# Patient Record
Sex: MALE | Race: WHITE | URBAN - METROPOLITAN AREA
[De-identification: names, ages, dates, MRNs, and addresses within clinical notes are randomized per-mention and may not be internally consistent; named-entity substitution may affect disease eponyms.]

---

## 2018-06-18 ENCOUNTER — TELEPHONE (OUTPATIENT)
Dept: CARDIOLOGY CLINIC | Age: 73
End: 2018-06-18

## 2018-06-19 ENCOUNTER — OFFICE VISIT (OUTPATIENT)
Dept: CARDIOLOGY CLINIC | Age: 73
End: 2018-06-19

## 2018-06-19 VITALS
BODY MASS INDEX: 32.45 KG/M2 | SYSTOLIC BLOOD PRESSURE: 120 MMHG | HEART RATE: 68 BPM | OXYGEN SATURATION: 95 % | DIASTOLIC BLOOD PRESSURE: 70 MMHG | WEIGHT: 261 LBS | HEIGHT: 75 IN

## 2018-06-19 DIAGNOSIS — E78.2 MIXED HYPERLIPIDEMIA: ICD-10-CM

## 2018-06-19 DIAGNOSIS — R07.9 CHEST PAIN, UNSPECIFIED TYPE: Primary | ICD-10-CM

## 2018-06-19 DIAGNOSIS — I25.10 CORONARY ARTERY DISEASE INVOLVING NATIVE HEART WITHOUT ANGINA PECTORIS, UNSPECIFIED VESSEL OR LESION TYPE: ICD-10-CM

## 2018-06-19 PROCEDURE — G8598 ASA/ANTIPLAT THER USED: HCPCS | Performed by: NURSE PRACTITIONER

## 2018-06-19 PROCEDURE — 93000 ELECTROCARDIOGRAM COMPLETE: CPT | Performed by: NURSE PRACTITIONER

## 2018-06-19 PROCEDURE — G8417 CALC BMI ABV UP PARAM F/U: HCPCS | Performed by: NURSE PRACTITIONER

## 2018-06-19 PROCEDURE — G8427 DOCREV CUR MEDS BY ELIG CLIN: HCPCS | Performed by: NURSE PRACTITIONER

## 2018-06-19 PROCEDURE — 1036F TOBACCO NON-USER: CPT | Performed by: NURSE PRACTITIONER

## 2018-06-19 PROCEDURE — 1123F ACP DISCUSS/DSCN MKR DOCD: CPT | Performed by: NURSE PRACTITIONER

## 2018-06-19 PROCEDURE — 4040F PNEUMOC VAC/ADMIN/RCVD: CPT | Performed by: NURSE PRACTITIONER

## 2018-06-19 PROCEDURE — 99214 OFFICE O/P EST MOD 30 MIN: CPT | Performed by: NURSE PRACTITIONER

## 2018-06-19 PROCEDURE — 3017F COLORECTAL CA SCREEN DOC REV: CPT | Performed by: NURSE PRACTITIONER

## 2018-06-26 ENCOUNTER — TELEPHONE (OUTPATIENT)
Dept: CARDIOLOGY CLINIC | Age: 73
End: 2018-06-26

## 2018-06-29 ENCOUNTER — PROCEDURE VISIT (OUTPATIENT)
Dept: CARDIOLOGY CLINIC | Age: 73
End: 2018-06-29

## 2018-06-29 DIAGNOSIS — I25.10 CORONARY ARTERY DISEASE INVOLVING NATIVE HEART WITHOUT ANGINA PECTORIS, UNSPECIFIED VESSEL OR LESION TYPE: Primary | ICD-10-CM

## 2018-06-29 DIAGNOSIS — R01.1 MURMUR: ICD-10-CM

## 2018-06-29 LAB
LV EF: 45 %
LVEF MODALITY: NORMAL

## 2018-06-29 PROCEDURE — 93306 TTE W/DOPPLER COMPLETE: CPT | Performed by: INTERNAL MEDICINE

## 2018-07-03 ENCOUNTER — OFFICE VISIT (OUTPATIENT)
Dept: CARDIOLOGY CLINIC | Age: 73
End: 2018-07-03

## 2018-07-03 VITALS
SYSTOLIC BLOOD PRESSURE: 102 MMHG | HEIGHT: 75 IN | HEART RATE: 56 BPM | BODY MASS INDEX: 32.02 KG/M2 | WEIGHT: 257.5 LBS | DIASTOLIC BLOOD PRESSURE: 62 MMHG

## 2018-07-03 DIAGNOSIS — E78.2 MIXED HYPERLIPIDEMIA: Primary | ICD-10-CM

## 2018-07-03 DIAGNOSIS — I25.10 CORONARY ARTERY DISEASE INVOLVING NATIVE HEART WITHOUT ANGINA PECTORIS, UNSPECIFIED VESSEL OR LESION TYPE: ICD-10-CM

## 2018-07-03 PROCEDURE — G8417 CALC BMI ABV UP PARAM F/U: HCPCS | Performed by: NURSE PRACTITIONER

## 2018-07-03 PROCEDURE — 3017F COLORECTAL CA SCREEN DOC REV: CPT | Performed by: NURSE PRACTITIONER

## 2018-07-03 PROCEDURE — 99214 OFFICE O/P EST MOD 30 MIN: CPT | Performed by: NURSE PRACTITIONER

## 2018-07-03 PROCEDURE — 1036F TOBACCO NON-USER: CPT | Performed by: NURSE PRACTITIONER

## 2018-07-03 PROCEDURE — G8427 DOCREV CUR MEDS BY ELIG CLIN: HCPCS | Performed by: NURSE PRACTITIONER

## 2018-07-03 PROCEDURE — 1123F ACP DISCUSS/DSCN MKR DOCD: CPT | Performed by: NURSE PRACTITIONER

## 2018-07-03 PROCEDURE — G8598 ASA/ANTIPLAT THER USED: HCPCS | Performed by: NURSE PRACTITIONER

## 2018-07-03 PROCEDURE — 4040F PNEUMOC VAC/ADMIN/RCVD: CPT | Performed by: NURSE PRACTITIONER

## 2018-07-03 RX ORDER — ATORVASTATIN CALCIUM 40 MG/1
40 TABLET, FILM COATED ORAL DAILY
Qty: 30 TABLET | Refills: 5 | Status: SHIPPED | OUTPATIENT
Start: 2018-07-03 | End: 2020-07-07

## 2018-07-03 RX ORDER — NITROGLYCERIN 0.4 MG/1
0.4 TABLET SUBLINGUAL EVERY 5 MIN PRN
Qty: 25 TABLET | Refills: 3 | Status: SHIPPED | OUTPATIENT
Start: 2018-07-03

## 2018-07-03 NOTE — LETTER
 aspirin 81 MG chewable tablet Take 81 mg by mouth daily.  ALPRAZOLAM PO Take 1 mg by mouth Xanax 3 mg tid      ranolazine (RANEXA) 500 MG SR tablet Take 1 tablet by mouth 2 times daily for 30 days. 60 tablet 3    ranolazine (RANEXA) 500 MG SR tablet Take 1 tablet by mouth 2 times daily for 30 days. 180 tablet 3     No current facility-administered medications on file prior to visit. REVIEW OF SYSTEMS:   CONSTITUTIONAL: No major weight gain or loss or fever. There's been a change in energy level, sleep pattern, or activity level. EYES: No new vision difficulties. ENT:  No HA, hearing loss or ringing in the ears. RESPIRATORY: No new SOB, PND, orthopnea or cough. CARDIOVASCULAR: See HPI  GI: No nausea, vomiting, diarrhea, constipation, abdominal pain, blood in stool. : No urinary frequency, urgency, hematuria or dysuria. SKIN: No rashes or skin lesions. MUSCULOSKELETAL: No new muscle or joint pain, no gait disturbances. NEUROLOGICAL: No syncope or TIA-like symptoms. PSYCHIATRIC: No anxiety, insomnia or depression  ENDO:  No fatigue or temperature intolerance. HEMA:  No bleeding or blood clots. Objective:   PHYSICAL EXAM:        VITALS:    /62 (Site: Right Arm, Position: Sitting, Cuff Size: Large Adult)   Pulse 56   Ht 6' 3\" (1.905 m)   Wt 257 lb 8 oz (116.8 kg)   BMI 32.19 kg/m²      CONSTITUTIONAL: Cooperative, no apparent distress  NEUROLOGIC:  Awake and orientated to person, place and time. PSYCH: Calm affect. SKIN: Warm and dry. HEENT: Sclera non-icteric, normocephalic  NECK:  neck supple, no elevation of JVP, normal carotid pulses with no bruits and thyroid normal size. LUNGS:  No increased work of breathing and clear to auscultation, no crackles or wheezing  CARDIOVASCULAR:  Regular rate and rhythm with no murmurs, gallops, rubs, or abnormal heart sounds, normal PMI.   The apical impulses not displaced  Heart tones are crisp and normal  Cervical veins are not engorged JVP less than 8 cm H2O  The carotid upstroke is normal in amplitude and contour without delay or bruit  JVP is not elevated  ABDOMEN:  Normal bowel sounds, non-distended and non-tender to palpation  EXT: No edema, no cyanosis. DATA:    No results found for: ALT  Lab Results   Component Value Date    CREATININE 1.0 06/20/2018     No results found for: TSH  Lab Results   Component Value Date    WBC 7.2 06/20/2018    HGB 13.5 06/20/2018     06/20/2018                 Assessment:     1. CAD (coronary artery disease)  CATH 2/15 at [facility]-getting records had cath but according to patient did not have stent (will get records  CATH-8/14 [address]  GXTS unreliable in the past  6/18-Stents patent  Mild non-obstructive disease  Global HK--EF 45-55%--Suggest Echo as OP to evaluate.     Imp: Non-Cardiac CP--7/10 CP at time of cath with palpable chest wall tenderness suggests at least some musculoskeletal component. Hx of 5 stents in the past, no chest pain, has not needed any NTG     2. Other and unspecified hyperlipidemia   12/17  TC-200, Trig-128, HDL-44, LDL-130  8/14 TC-169, Trig-161, HDL-33, [id number],  Not taking statin at this time  Start lipitor 40 mg   -    Plan:   I      The patient is not currently smoking. The risks related to smoking were reviewed with the patient. Recommend maintaining a smoke-free lifestyle. Products available for smoking cessation were discussed in detail. PLAN:    Add lipitor 40 mg (stopped this awhile ago -just ran out)  Check labs 6 weeks  FU DR. Quijano  When gets back from [facility]    Thank you for allowing to us to participate in the care of Levon Flor. [address]        If you have questions, please do not hesitate to call me. I look forward to following Carlos Mcdaniels along with you.     Sincerely,        Johanna Rivera, DELPHINE - CNP

## 2018-07-03 NOTE — LETTER
[address] [place] [address] [address] [address]  Phone: [phone]  Fax: [phone]    DELPHINE Schuler CNP        July 3, 2018     Yennifer Castellanos, [address] [place] [id number]    Patient: Charlette Jenkins  MR Number: [id number]  YOB: 1945  Date of Visit: 7/3/2018    Dear Dr. Yennifer Castellanos:    \    Assessment:       Plan:     If you have questions, please do not hesitate to call me. I look forward to following Kamala Harding along with you.     Sincerely,        DELPHINE Schuler CNP

## 2018-07-03 NOTE — PROGRESS NOTES
loss or fever. There's been a change in energy level, sleep pattern, or activity level. EYES: No new vision difficulties. ENT:  No HA, hearing loss or ringing in the ears. RESPIRATORY: No new SOB, PND, orthopnea or cough. CARDIOVASCULAR: See HPI  GI: No nausea, vomiting, diarrhea, constipation, abdominal pain, blood in stool. : No urinary frequency, urgency, hematuria or dysuria. SKIN: No rashes or skin lesions. MUSCULOSKELETAL: No new muscle or joint pain, no gait disturbances. NEUROLOGICAL: No syncope or TIA-like symptoms. PSYCHIATRIC: No anxiety, insomnia or depression  ENDO:  No fatigue or temperature intolerance. HEMA:  No bleeding or blood clots. Objective:   PHYSICAL EXAM:        VITALS:    /62 (Site: Right Arm, Position: Sitting, Cuff Size: Large Adult)   Pulse 56   Ht 6' 3\" (1.905 m)   Wt 257 lb 8 oz (116.8 kg)   BMI 32.19 kg/m²     CONSTITUTIONAL: Cooperative, no apparent distress  NEUROLOGIC:  Awake and orientated to person, place and time. PSYCH: Calm affect. SKIN: Warm and dry. HEENT: Sclera non-icteric, normocephalic  NECK:  neck supple, no elevation of JVP, normal carotid pulses with no bruits and thyroid normal size. LUNGS:  No increased work of breathing and clear to auscultation, no crackles or wheezing  CARDIOVASCULAR:  Regular rate and rhythm with no murmurs, gallops, rubs, or abnormal heart sounds, normal PMI. The apical impulses not displaced  Heart tones are crisp and normal  Cervical veins are not engorged  JVP less than 8 cm H2O  The carotid upstroke is normal in amplitude and contour without delay or bruit  JVP is not elevated  ABDOMEN:  Normal bowel sounds, non-distended and non-tender to palpation  EXT: No edema, no cyanosis.     DATA:    No results found for: ALT  Lab Results   Component Value Date    CREATININE 1.0 06/20/2018     No results found for: TSH  Lab Results   Component Value Date    WBC 7.2 06/20/2018    HGB 13.5 06/20/2018     06/20/2018                 Assessment:     1. CAD (coronary artery disease)  CATH 2/15 at [facility]-getting records had cath but according to patient did not have stent (will get records  CATH-8/14 [address]  GXTS unreliable in the past  6/18-Stents patent  Mild non-obstructive disease  Global HK--EF 45-55%--Suggest Echo as OP to evaluate.     Imp: Non-Cardiac CP--7/10 CP at time of cath with palpable chest wall tenderness suggests at least some musculoskeletal component. Hx of 5 stents in the past, no chest pain, has not needed any NTG     2. Other and unspecified hyperlipidemia   12/17  TC-200, Trig-128, HDL-44, LDL-130  8/14 TC-169, Trig-161, HDL-33, [id number],  Not taking statin at this time  Start lipitor 40 mg   -    Plan:   I      The patient is not currently smoking. The risks related to smoking were reviewed with the patient. Recommend maintaining a smoke-free lifestyle. Products available for smoking cessation were discussed in detail. PLAN:    Add lipitor 40 mg (stopped this awhile ago -just ran out)  Check labs 6 weeks  FU DR. Quijano  When gets back from [place]    Thank you for allowing to us to participate in the care of Vandana Bradshaw.     [address]

## 2018-07-03 NOTE — COMMUNICATION BODY
[facility]     Outpatient Follow Up Note    Subjective:   CHIEF COMPLAINT / HPI:    Storm Diana is 67 y.o. male who is here for an acute visit. He has not been her for few years, but his PCP wanted him seen for chest pain. In 7/14 he was admitted to [facility] with chest pain, had angiogram no intervention, then in 2/15 in [place] was admitted with pneumonia and had chest pain and had repeat angiogram (trying to get results). He states they did not do any intervention. He has been having chest pain, with activity and with rest.  No  SOB, palpitations, dizziness. Past Medical History:    Past Medical History:   Diagnosis Date    Arthritis     CAD (coronary artery disease)     5 stents in coronaries last one 2 years    Chronic pain     nilo knees    COPD (chronic obstructive pulmonary disease) (HCC)     [facility]bestosis from [facility]    Hyperlipidemia      Social History:    History   Smoking Status    Former Smoker    Quit date: 9/27/1995   Smokeless Tobacco    Never Used     Current Medications:  Current Outpatient Prescriptions on File Prior to Visit   Medication Sig Dispense Refill    traMADol (ULTRAM) 50 MG tablet Take 50 mg by mouth 3 times daily. Kendra Burbank DULoxetine (CYMBALTA) 30 MG extended release capsule Take 30 mg by mouth daily      gabapentin (NEURONTIN) 600 MG tablet Take 600 mg by mouth 3 times daily. Kendra Burbank clopidogrel (PLAVIX) 75 MG tablet Take 75 mg by mouth daily      HYDROcodone-acetaminophen (NORCO) 7.5-325 MG per tablet       aspirin 81 MG chewable tablet Take 81 mg by mouth daily.  ALPRAZOLAM PO Take 1 mg by mouth Xanax 3 mg tid      ranolazine (RANEXA) 500 MG SR tablet Take 1 tablet by mouth 2 times daily for 30 days. 60 tablet 3    ranolazine (RANEXA) 500 MG SR tablet Take 1 tablet by mouth 2 times daily for 30 days. 180 tablet 3     No current facility-administered medications on file prior to visit.       REVIEW OF SYSTEMS:   CONSTITUTIONAL: No major weight gain or 06/20/2018                 Assessment:     1. CAD (coronary artery disease)  CATH 2/15 at [facility]-getting records had cath but according to patient did not have stent (will get records  CATH-8/14 [address]  GXTS unreliable in the past  6/18-Stents patent  Mild non-obstructive disease  Global HK--EF 45-55%--Suggest Echo as OP to evaluate.     Imp: Non-Cardiac CP--7/10 CP at time of cath with palpable chest wall tenderness suggests at least some musculoskeletal component. Hx of 5 stents in the past, no chest pain, has not needed any NTG     2. Other and unspecified hyperlipidemia   12/17  TC-200, Trig-128, HDL-44, LDL-130  8/14 TC-169, Trig-161, HDL-33, [id number],  Not taking statin at this time  Start lipitor 40 mg   -    Plan:   I      The patient is not currently smoking. The risks related to smoking were reviewed with the patient. Recommend maintaining a smoke-free lifestyle. Products available for smoking cessation were discussed in detail. PLAN:    Add lipitor 40 mg (stopped this awhile ago -just ran out)  Check labs 6 weeks  FU DR. Quijano  When gets back from [facility]    Thank you for allowing to us to participate in the care of Deborah Nielson.     [address]

## 2018-07-18 ENCOUNTER — TELEPHONE (OUTPATIENT)
Dept: CARDIOLOGY CLINIC | Age: 73
End: 2018-07-18

## 2019-06-27 ENCOUNTER — OFFICE VISIT (OUTPATIENT)
Dept: CARDIOLOGY CLINIC | Age: 74
End: 2019-06-27
Payer: MEDICARE

## 2019-06-27 VITALS
WEIGHT: 256 LBS | SYSTOLIC BLOOD PRESSURE: 121 MMHG | HEIGHT: 75 IN | HEART RATE: 77 BPM | DIASTOLIC BLOOD PRESSURE: 75 MMHG | BODY MASS INDEX: 31.83 KG/M2

## 2019-06-27 DIAGNOSIS — R07.9 CHEST PAIN, UNSPECIFIED TYPE: Primary | ICD-10-CM

## 2019-06-27 DIAGNOSIS — R42 DIZZINESS: ICD-10-CM

## 2019-06-27 DIAGNOSIS — I10 HYPERTENSION, UNSPECIFIED TYPE: ICD-10-CM

## 2019-06-27 DIAGNOSIS — I25.10 CORONARY ARTERY DISEASE INVOLVING NATIVE HEART WITHOUT ANGINA PECTORIS, UNSPECIFIED VESSEL OR LESION TYPE: ICD-10-CM

## 2019-06-27 DIAGNOSIS — E78.2 MIXED HYPERLIPIDEMIA: ICD-10-CM

## 2019-06-27 DIAGNOSIS — R53.83 FATIGUE, UNSPECIFIED TYPE: ICD-10-CM

## 2019-06-27 PROCEDURE — G8417 CALC BMI ABV UP PARAM F/U: HCPCS | Performed by: INTERNAL MEDICINE

## 2019-06-27 PROCEDURE — 4040F PNEUMOC VAC/ADMIN/RCVD: CPT | Performed by: INTERNAL MEDICINE

## 2019-06-27 PROCEDURE — 1036F TOBACCO NON-USER: CPT | Performed by: INTERNAL MEDICINE

## 2019-06-27 PROCEDURE — G8427 DOCREV CUR MEDS BY ELIG CLIN: HCPCS | Performed by: INTERNAL MEDICINE

## 2019-06-27 PROCEDURE — 3017F COLORECTAL CA SCREEN DOC REV: CPT | Performed by: INTERNAL MEDICINE

## 2019-06-27 PROCEDURE — 93000 ELECTROCARDIOGRAM COMPLETE: CPT | Performed by: INTERNAL MEDICINE

## 2019-06-27 PROCEDURE — G8598 ASA/ANTIPLAT THER USED: HCPCS | Performed by: INTERNAL MEDICINE

## 2019-06-27 PROCEDURE — 99213 OFFICE O/P EST LOW 20 MIN: CPT | Performed by: INTERNAL MEDICINE

## 2019-06-27 PROCEDURE — 1123F ACP DISCUSS/DSCN MKR DOCD: CPT | Performed by: INTERNAL MEDICINE

## 2019-06-27 NOTE — PATIENT INSTRUCTIONS
Ice to chest wall over a t shirt 3-4 times a day  Sleep study  Refer to Dr Lulu Vicente  Coenzyme q 10 400 mg daily  holter monitor  Follow up in 6months

## 2019-06-27 NOTE — PROGRESS NOTES
[address]   Cardiac Followup    Referring Provider:  Art Rivero MD     Chief Complaint   Patient presents with    Hyperlipidemia    Chest Pain     still having chest pain on a daily basis with neck pain         History of Present Illness:  Mr Zheng Meek is seen as a follow up for CAD,htn,hchol. In the interval since his last visit, he had a fall in [place]. His wife noticed he was not breathing normally in the night. He woke up, landed on the floor, and skinned his knees. He went to the ER was evaluated  (labs CTPA)and discharged. Since coming home from [place], he states he is dizzy with any activity, associated with extreme fatigue. He cannot even pull weeds without feeling dizzy. He does not routinely exercise. His wife has witnessed irregular breathing patterns when he sleeps. Also has random chest pain, sometimes described as a \"light pain\" associated with back pain cramps in knees and legs. There are no exacerbating or alleviating factors. He has asthenia    Patient is compliant  with medication. Fatigue and asthenia         Past Medical History:   has a past medical history of Arthritis, CAD (coronary artery disease), Chronic pain, COPD (chronic obstructive pulmonary disease) ([facility] [address] and Hyperlipidemia. Surgical History:   has a past surgical history that includes back surgery; Testicle removal; Knee arthroscopy; Knee Arthroplasty; Colonoscopy (1/17/2012); and hernia repair (11/11). Social History:   reports that he quit smoking about 23 years ago. He smoked 5.00 packs per day. He has never used smokeless tobacco. He reports that he does not drink alcohol. Family History:  family history includes High Blood Pressure in his mother; High Cholesterol in his mother; Stroke in his father.      Home Medications:  Current Outpatient Medications   Medication Sig Dispense Refill    atorvastatin (LIPITOR) 40 MG tablet Take 1 tablet by mouth daily 30 tablet 5    nitroGLYCERIN (NITROSTAT) 0.4 MG SL tablet Place 1 tablet under the tongue every 5 minutes as needed for Chest pain 25 tablet 3    traMADol (ULTRAM) 50 MG tablet Take 50 mg by mouth 3 times daily. Blease Darabeni DULoxetine (CYMBALTA) 30 MG extended release capsule Take 60 mg by mouth daily       gabapentin (NEURONTIN) 600 MG tablet Take 600 mg by mouth 3 times daily. Indications: takes 100-200 morning and evening       clopidogrel (PLAVIX) 75 MG tablet Take 75 mg by mouth daily      HYDROcodone-acetaminophen (NORCO) 7.5-325 MG per tablet       ranolazine (RANEXA) 500 MG SR tablet Take 1 tablet by mouth 2 times daily for 30 days. 60 tablet 3    ranolazine (RANEXA) 500 MG SR tablet Take 1 tablet by mouth 2 times daily for 30 days. 180 tablet 3    aspirin 81 MG chewable tablet Take 81 mg by mouth daily.  ALPRAZOLAM PO Take 1 mg by mouth Xanax 3 mg tid       No current facility-administered medications for this visit. Allergies:  Penicillins     Review of Systems:   · Constitutional: there has been no unanticipated weight loss. There's been no change in energy level, sleep pattern, or activity level. · Eyes: No visual changes or diplopia. No scleral icterus. · ENT: No Headaches, hearing loss or vertigo. No mouth sores or sore throat. · Cardiovascular: Reviewed in HPI  · Respiratory: No cough or wheezing, no sputum production. No hematemesis. · Gastrointestinal: No abdominal pain, appetite loss, blood in stools. No change in bowel or bladder habits. · Genitourinary: No dysuria, trouble voiding, or hematuria. · Musculoskeletal:  No gait disturbance, weakness or joint complaints. · Integumentary: No rash or pruritis. · Neurological: No headache, diplopia, change in muscle strength, numbness or tingling. No change in gait, balance, coordination, mood, affect, memory, mentation, behavior. · Psychiatric: No anxiety, no depression. · Endocrine: No malaise, fatigue or temperature intolerance.  No excessive thirst, fluid intake, or urination. No tremor. · Hematologic/Lymphatic: No abnormal bruising or bleeding, blood clots or swollen lymph nodes. · Allergic/Immunologic: No nasal congestion or hives. Physical Examination:    Vitals:    06/27/19 1207   BP: 121/75   Pulse: 77        Constitutional and General Appearance:   . NAD   SKIN:  .     Warm and dry  EYES:    .     EOMI  Neck:   . Normal carotid contour  Respiratory:  · Normal excursion and expansion without use of accessory muscles  · Resp Auscultation: Normal breath sounds without dullness  Cardiovascular:  · The apical impulses not displaced +CWT  · Heart tones are crisp and normal  · Cervical veins are not engorged  · Normal S1S2, No S3, No Murmur  · Peripheral pulses are symmetrical and full  Extremities:  · There is no clubbing, cyanosis of the extremities. · No edema  · Femoral Arteries: 2+ and equal  · Pedal Pulses: 2+ and equal   Abdomen:  · No masses or tenderness  · Liver/Spleen: No Abnormalities Noted  Neurological/Psychiatric:  · Alert and oriented in all spheres  · Moves all extremities well  · Exhibits normal gait balance and coordination  · No abnormalities of mood, affect, memory      Ct of Abd in [place]  Assessment:   CAD  2018 stents patent,noncardiac chest pain,EF 45-55%  ekg today unremarkable  Musculoskeletal chest wall pain--recommend ice to chest wall over a t shirt 20 minutes three times a day  Tolerates asa without excess bleeding bruising    htn  /75 (Site: Left Upper Arm, Position: Sitting, Cuff Size: Medium Adult)   Pulse 77   Ht 6' 3\" (1.905 m)   Wt 256 lb (116.1 kg)   BMI 32.00 kg/m²        hchol  2018 ldl 73  Recently checked in [place]  Tolerates lipitor 40  Recommend coenzyme q 10 400 mg daily    Fatigue/asthenia  Sleep study and referral to Dr Rm Low  coenzyme q 10 400 mg daily      Dizziness  holter monitor      Fatigue  Sleep study  Refer to Dr Rm Low  Coenzyme q 10 400 mg daily. Plan:   Ice to chest wall over a t shirt 3-4 times a day  Sleep study  Refer to Dr Pam Ryan  Coenzyme q 10 400 mg daily  holter monitor  Follow up in 6months  Thank you for allowing me to participate in the care of this individual.    Ela Golden M.D., [facility]. Scribe Attestation:  Bethany Tracy, am scribing for and in the presence of Aileen Spencer MD.   Dominga Lock 06/27/19 12:50 PM   Provider Nisa Gay is working as a scribe for and in the presence of magalie Spencer MD). Working as a scribe, Amancesar Mehtabarney may have prepopulated components of this note with my historical  intellectual property under my direct supervision. Any additions to this intellectual property were performed in my presence and at my direction. Furthermore, the content and accuracy of this note have been reviewed by magalie Spencer MD).

## 2019-07-29 ENCOUNTER — TELEPHONE (OUTPATIENT)
Dept: CARDIOLOGY CLINIC | Age: 74
End: 2019-07-29

## 2019-07-31 PROCEDURE — 93227 XTRNL ECG REC<48 HR R&I: CPT | Performed by: INTERNAL MEDICINE

## 2019-07-31 PROCEDURE — 93225 XTRNL ECG REC<48 HRS REC: CPT | Performed by: INTERNAL MEDICINE

## 2019-08-06 ENCOUNTER — TELEPHONE (OUTPATIENT)
Dept: CARDIOLOGY CLINIC | Age: 74
End: 2019-08-06

## 2019-09-13 ENCOUNTER — TELEPHONE (OUTPATIENT)
Dept: CARDIOLOGY CLINIC | Age: 74
End: 2019-09-13

## 2019-11-20 ENCOUNTER — TELEPHONE (OUTPATIENT)
Dept: CARDIOLOGY CLINIC | Age: 74
End: 2019-11-20

## 2019-11-20 DIAGNOSIS — E78.2 MIXED HYPERLIPIDEMIA: Primary | ICD-10-CM

## 2019-11-21 RX ORDER — ROSUVASTATIN CALCIUM 5 MG/1
5 TABLET, COATED ORAL DAILY
Qty: 30 TABLET | Refills: 3 | Status: SHIPPED | OUTPATIENT
Start: 2019-11-21 | End: 2020-07-07

## 2020-07-07 ENCOUNTER — OFFICE VISIT (OUTPATIENT)
Dept: CARDIOLOGY CLINIC | Age: 75
End: 2020-07-07
Payer: MEDICARE

## 2020-07-07 VITALS
DIASTOLIC BLOOD PRESSURE: 60 MMHG | HEART RATE: 85 BPM | TEMPERATURE: 97.3 F | RESPIRATION RATE: 20 BRPM | WEIGHT: 241 LBS | BODY MASS INDEX: 29.97 KG/M2 | OXYGEN SATURATION: 95 % | SYSTOLIC BLOOD PRESSURE: 118 MMHG | HEIGHT: 75 IN

## 2020-07-07 PROCEDURE — 1036F TOBACCO NON-USER: CPT | Performed by: INTERNAL MEDICINE

## 2020-07-07 PROCEDURE — 1123F ACP DISCUSS/DSCN MKR DOCD: CPT | Performed by: INTERNAL MEDICINE

## 2020-07-07 PROCEDURE — 99214 OFFICE O/P EST MOD 30 MIN: CPT | Performed by: INTERNAL MEDICINE

## 2020-07-07 PROCEDURE — 4040F PNEUMOC VAC/ADMIN/RCVD: CPT | Performed by: INTERNAL MEDICINE

## 2020-07-07 PROCEDURE — G8417 CALC BMI ABV UP PARAM F/U: HCPCS | Performed by: INTERNAL MEDICINE

## 2020-07-07 PROCEDURE — 93000 ELECTROCARDIOGRAM COMPLETE: CPT | Performed by: INTERNAL MEDICINE

## 2020-07-07 PROCEDURE — G8427 DOCREV CUR MEDS BY ELIG CLIN: HCPCS | Performed by: INTERNAL MEDICINE

## 2020-07-07 RX ORDER — ALBUTEROL SULFATE 90 UG/1
AEROSOL, METERED RESPIRATORY (INHALATION)
COMMUNITY

## 2020-07-07 RX ORDER — RANOLAZINE 500 MG/1
500 TABLET, EXTENDED RELEASE ORAL 2 TIMES DAILY
Qty: 180 TABLET | Refills: 3 | Status: SHIPPED | OUTPATIENT
Start: 2020-07-07 | End: 2020-08-06

## 2020-07-07 RX ORDER — HYDROXYZINE PAMOATE 25 MG/1
CAPSULE ORAL
COMMUNITY

## 2020-07-07 NOTE — PROGRESS NOTES
[facility]   Cardiac Followup    Referring Provider:  Leana White MD     Chief Complaint   Patient presents with    6 Month Follow-Up    Coronary Artery Disease    Hyperlipidemia    Chest Pain        History of Present Illness:  Mr Parisa Adams is seen as a follow up for CAD,htn,hchol. He has asbestos exposure and copd  In the interval since his last visit, he was hospitalized and treated for pancreatitis  He is here with his wife. They just returned from [place]. He is not active. \"Goes from the bed to the couch to the bed\" Does not feel well, due to general aches and pains. Unclear if coenzyme q 10 was helpful. He has worsening exertional sob when the weather is hot. At night he has occasional sharp midsternal chest pain, that last a few seconds, that goes away on its own. No exacerbating or alleviating factors. He has also had random chest pressure. Occasionally gets the same pain in his neck(bilateral sides) that reminds him of his previous angina. This has not increased in severity or frequency in the past couple years. He has dizziness when going up and down steps, sometimes this occurs more than others. This is not increasing in severity or frequency. Did wear a holter monitor last year that did not show any long or sustained dangerous rhythm. Patient ran out of renexa, and quit coenzyme q 10. Compliant with other medications      Past Medical History:   has a past medical history of Arthritis, CAD (coronary artery disease), Chronic pain, COPD (chronic obstructive pulmonary disease) ([facility] [address] and Hyperlipidemia. Surgical History:   has a past surgical history that includes back surgery; Testicle removal; Knee arthroscopy; Knee Arthroplasty; Colonoscopy (1/17/2012); and hernia repair (11/11). Social History:   reports that he quit smoking about 24 years ago. He smoked 5.00 packs per day. He has never used smokeless tobacco. He reports that he does not drink alcohol.      Family History:  family history includes High Blood Pressure in his mother; High Cholesterol in his mother; Stroke in his father. Home Medications:  Current Outpatient Medications   Medication Sig Dispense Refill    hydrOXYzine (VISTARIL) 25 MG capsule hydroxyzine pamoate 25 mg capsule   TAKE 1 CAPSULE BY MOUTH THREE TIMES A DAY      albuterol sulfate HFA (VENTOLIN HFA) 108 (90 Base) MCG/ACT inhaler Ventolin HFA 90 mcg/actuation aerosol inhaler      nitroGLYCERIN (NITROSTAT) 0.4 MG SL tablet Place 1 tablet under the tongue every 5 minutes as needed for Chest pain 25 tablet 3    traMADol (ULTRAM) 50 MG tablet Take 50 mg by mouth 3 times daily. Kalie Sears DULoxetine (CYMBALTA) 30 MG extended release capsule Take 60 mg by mouth daily       clopidogrel (PLAVIX) 75 MG tablet Take 75 mg by mouth daily      aspirin 81 MG chewable tablet Take 81 mg by mouth daily.  ALPRAZOLAM PO Take 1 mg by mouth Xanax 3 mg tid      ranolazine (RANEXA) 500 MG SR tablet Take 1 tablet by mouth 2 times daily for 30 days. (Patient not taking: Reported on 7/7/2020) 180 tablet 3     No current facility-administered medications for this visit. Allergies:  Penicillins     Review of Systems:   · Constitutional: there has been no unanticipated weight loss. There's been no change in energy level, sleep pattern, or activity level. · Eyes: No visual changes or diplopia. No scleral icterus. · ENT: No Headaches, hearing loss or vertigo. No mouth sores or sore throat. · Cardiovascular: Reviewed in HPI  · Respiratory: No cough or wheezing, no sputum production. No hematemesis. · Gastrointestinal: No abdominal pain, appetite loss, blood in stools. No change in bowel or bladder habits. · Genitourinary: No dysuria, trouble voiding, or hematuria. · Musculoskeletal:  No gait disturbance, weakness or joint complaints. · Integumentary: No rash or pruritis.   · Neurological: No headache, diplopia, change in muscle strength, numbness or tingling. No change in gait, balance, coordination, mood, affect, memory, mentation, behavior. · Psychiatric: No anxiety, no depression. · Endocrine: No malaise, fatigue or temperature intolerance. No excessive thirst, fluid intake, or urination. No tremor. · Hematologic/Lymphatic: No abnormal bruising or bleeding, blood clots or swollen lymph nodes. · Allergic/Immunologic: No nasal congestion or hives. Physical Examination:    Vitals:    07/07/20 1525   BP: 118/60   Pulse: 85   Resp: 20   Temp: 97.3 °F (36.3 °C)   SpO2: 95%        Constitutional and General Appearance:   . NAD   SKIN:  .     Warm and dry  EYES:    .     EOMI  Neck:   . Normal carotid contour  Respiratory:  Normal excursion and expansion without use of accessory muscles  Resp Auscultation: Normal breath sounds without dullness  Cardiovascular: The apical impulses not displaced  Heart tones are crisp and normal  Cervical veins are not engorged  Normal S1S2, No S3, No Murmur  Peripheral pulses are symmetrical and full  Extremities: There is no clubbing, cyanosis of the extremities. No edema  Femoral Arteries: 2+ and equal  Pedal Pulses: 2+ and equal   Abdomen:  No masses or tenderness  Liver/Spleen: No Abnormalities Noted  Neurological/Psychiatric:  Alert and oriented in all spheres  No abnormalities of mood, affect, memory    All testing and labs listed below were personally reviewed. Ct of Abd in [place]  Assessment:   CAD  2018 stents patent,EF 45-55%  On asa without excess bleeding or bruising  Restart ranexafor chest pain    Chest pain  No increase in severity, but is off of ranexa  Will restart ranexa.     Sob  Previous asbestos exposure  Evaluate with PFT    htn  /60 (Site: Left Upper Arm, Position: Sitting, Cuff Size: Medium Adult)   Pulse 85   Temp 97.3 °F (36.3 °C)   Resp 20   Ht 6' 3\" (1.905 m)   Wt 241 lb (109.3 kg)   SpO2 95%   BMI 30.12 kg/m²        hchol  2018 ldl 73  8/10/19  Tc 174  Tri 219 hdl 33 ldl

## 2020-07-24 ENCOUNTER — TELEPHONE (OUTPATIENT)
Dept: CARDIOLOGY CLINIC | Age: 75
End: 2020-07-24

## 2021-01-01 ENCOUNTER — PROCEDURE VISIT (OUTPATIENT)
Dept: NEUROLOGY | Age: 76
End: 2021-01-01
Payer: MEDICARE

## 2021-01-01 DIAGNOSIS — M79.604 RIGHT LEG PAIN: Primary | ICD-10-CM

## 2021-01-01 PROCEDURE — 95886 MUSC TEST DONE W/N TEST COMP: CPT | Performed by: PSYCHIATRY & NEUROLOGY

## 2021-01-01 PROCEDURE — 95908 NRV CNDJ TST 3-4 STUDIES: CPT | Performed by: PSYCHIATRY & NEUROLOGY

## 2021-11-15 NOTE — PROGRESS NOTES
Samuel Montano M.D. [facility]) Physicians/[facility]  Board Certified in [address] [facility] [address] [facility], [address]    EMG / NERVE CONDUCTION STUDY      PATIENT:  Gretchen Choi       DATE OF EM/15/21     YOB: 1945       REASON FOR EMG:   Low back pain and right leg pain      REFERRING PHYSICIAN:  Jesus Manuel Lawson MD  [address] [place] [address],  [address]     SUMMARY:   The right peroneal motor nerve studies are low amplitude and slow conduction velocity. The right posterior tibial motor nerve study had a normal amplitude and slow conduction velocity. The right superficial peroneal sensory nerve study was not recordable. Needle EMG of several muscles in the right lower extremity was normal.  Needle EMG of the right lumbar paraspinal muscles showed evidence of denervation with fibrillations and positive waves. CLINICAL DIAGNOSIS:  Lumbar radiculopathy        EMG RESULTS:   1. 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 However lumbar spinal stenosis can also cause isolated paraspinal denervation and cannot be totally ruled out in this patient. 2.  This patient also has a mild generalized sensorimotor mixed polyneuropathy.        ---------------------------------------------  Samuel Montano M.D.   Electromyographer / Neurologist

## 2021-11-15 NOTE — PATIENT INSTRUCTIONS
Verbal consent was obtained from patient and/or patient's advocate for in office procedure with Dr. Gabbie Veras (EMG or EEG).